# Patient Record
Sex: MALE | Race: WHITE | NOT HISPANIC OR LATINO | Employment: OTHER | URBAN - NONMETROPOLITAN AREA
[De-identification: names, ages, dates, MRNs, and addresses within clinical notes are randomized per-mention and may not be internally consistent; named-entity substitution may affect disease eponyms.]

---

## 2023-02-13 ENCOUNTER — TELEPHONE (OUTPATIENT)
Dept: CARDIOLOGY | Facility: CLINIC | Age: 78
End: 2023-02-13
Payer: MEDICARE

## 2023-02-13 NOTE — TELEPHONE ENCOUNTER
HUB: [facility] For patient to call our office. We can get him in to see Dr. Jon on 2/16/2023 @ 10:30.

## 2023-02-16 ENCOUNTER — OFFICE VISIT (OUTPATIENT)
Dept: CARDIOLOGY | Facility: CLINIC | Age: 78
End: 2023-02-16
Payer: MEDICARE

## 2023-02-16 VITALS
WEIGHT: 140.4 LBS | SYSTOLIC BLOOD PRESSURE: 132 MMHG | OXYGEN SATURATION: 88 % | HEIGHT: 70 IN | BODY MASS INDEX: 20.1 KG/M2 | HEART RATE: 101 BPM | DIASTOLIC BLOOD PRESSURE: 80 MMHG

## 2023-02-16 DIAGNOSIS — I50.9 CONGESTIVE HEART FAILURE, UNSPECIFIED HF CHRONICITY, UNSPECIFIED HEART FAILURE TYPE: ICD-10-CM

## 2023-02-16 DIAGNOSIS — R06.02 SHORTNESS OF BREATH: ICD-10-CM

## 2023-02-16 DIAGNOSIS — I65.23 BILATERAL CAROTID ARTERY STENOSIS: ICD-10-CM

## 2023-02-16 DIAGNOSIS — I10 ESSENTIAL HYPERTENSION: ICD-10-CM

## 2023-02-16 DIAGNOSIS — I48.91 ATRIAL FIBRILLATION, UNSPECIFIED TYPE: Primary | ICD-10-CM

## 2023-02-16 DIAGNOSIS — J90 PLEURAL EFFUSION: ICD-10-CM

## 2023-02-16 DIAGNOSIS — Z87.891 FORMER SMOKER: ICD-10-CM

## 2023-02-16 DIAGNOSIS — I73.9 PAD (PERIPHERAL ARTERY DISEASE): ICD-10-CM

## 2023-02-16 DIAGNOSIS — E78.2 MIXED HYPERLIPIDEMIA: ICD-10-CM

## 2023-02-16 DIAGNOSIS — R60.0 BILATERAL LEG EDEMA: ICD-10-CM

## 2023-02-16 PROBLEM — K21.9 GERD (GASTROESOPHAGEAL REFLUX DISEASE): Status: ACTIVE | Noted: 2023-02-16

## 2023-02-16 PROBLEM — H40.9 GLAUCOMA: Status: ACTIVE | Noted: 2023-02-16

## 2023-02-16 PROBLEM — C61 PROSTATE CANCER: Status: ACTIVE | Noted: 2023-02-16

## 2023-02-16 PROBLEM — J44.9 COPD (CHRONIC OBSTRUCTIVE PULMONARY DISEASE): Status: ACTIVE | Noted: 2023-02-16

## 2023-02-16 PROBLEM — E03.9 HYPOTHYROIDISM: Status: ACTIVE | Noted: 2023-02-16

## 2023-02-16 PROBLEM — J18.9 PNEUMONIA: Status: ACTIVE | Noted: 2023-02-16

## 2023-02-16 PROBLEM — D64.9 ANEMIA: Status: ACTIVE | Noted: 2023-02-16

## 2023-02-16 PROCEDURE — 3079F DIAST BP 80-89 MM HG: CPT | Performed by: INTERNAL MEDICINE

## 2023-02-16 PROCEDURE — 99204 OFFICE O/P NEW MOD 45 MIN: CPT | Performed by: INTERNAL MEDICINE

## 2023-02-16 PROCEDURE — 3075F SYST BP GE 130 - 139MM HG: CPT | Performed by: INTERNAL MEDICINE

## 2023-02-16 RX ORDER — BUDESONIDE 0.5 MG/2ML
INHALANT ORAL 2 TIMES DAILY
COMMUNITY
Start: 2023-02-02

## 2023-02-16 RX ORDER — FUROSEMIDE 40 MG/1
TABLET ORAL
Start: 2023-02-16 | End: 2023-03-01 | Stop reason: SDUPTHER

## 2023-02-16 RX ORDER — CHOLECALCIFEROL (VITAMIN D3) 125 MCG
5 CAPSULE ORAL NIGHTLY
COMMUNITY
End: 2023-02-16

## 2023-02-16 RX ORDER — APIXABAN 2.5 MG/1
TABLET, FILM COATED ORAL 2 TIMES DAILY
COMMUNITY
Start: 2023-02-02 | End: 2023-02-16 | Stop reason: DRUGHIGH

## 2023-02-16 RX ORDER — BIMATOPROST 0.1 MG/ML
1 SOLUTION/ DROPS OPHTHALMIC NIGHTLY
COMMUNITY
Start: 2022-12-07

## 2023-02-16 RX ORDER — FUROSEMIDE 40 MG/1
TABLET ORAL DAILY
COMMUNITY
Start: 2023-02-02 | End: 2023-02-16 | Stop reason: SDUPTHER

## 2023-02-16 RX ORDER — LEVOTHYROXINE SODIUM 0.07 MG/1
TABLET ORAL DAILY
COMMUNITY
Start: 2023-02-08 | End: 2023-04-17 | Stop reason: DRUGHIGH

## 2023-02-16 RX ORDER — ALBUTEROL SULFATE 90 UG/1
2 AEROSOL, METERED RESPIRATORY (INHALATION) EVERY 4 HOURS PRN
COMMUNITY

## 2023-02-16 RX ORDER — METOPROLOL TARTRATE 50 MG/1
50 TABLET, FILM COATED ORAL 2 TIMES DAILY
COMMUNITY
End: 2023-02-16 | Stop reason: DRUGHIGH

## 2023-02-16 RX ORDER — EZETIMIBE 10 MG/1
10 TABLET ORAL DAILY
COMMUNITY

## 2023-02-16 RX ORDER — TAMSULOSIN HYDROCHLORIDE 0.4 MG/1
1 CAPSULE ORAL DAILY
COMMUNITY
End: 2023-02-16

## 2023-02-16 RX ORDER — ALBUTEROL SULFATE 2.5 MG/3ML
2.5 SOLUTION RESPIRATORY (INHALATION) EVERY 4 HOURS PRN
COMMUNITY

## 2023-02-16 RX ORDER — BUDESONIDE, GLYCOPYRROLATE, AND FORMOTEROL FUMARATE 160; 9; 4.8 UG/1; UG/1; UG/1
2 AEROSOL, METERED RESPIRATORY (INHALATION) 2 TIMES DAILY
COMMUNITY
Start: 2023-02-13

## 2023-02-16 RX ORDER — BRINZOLAMIDE/BRIMONIDINE TARTRATE 10; 2 MG/ML; MG/ML
1 SUSPENSION/ DROPS OPHTHALMIC 2 TIMES DAILY
COMMUNITY
Start: 2022-12-07

## 2023-02-16 RX ORDER — PANTOPRAZOLE SODIUM 40 MG/1
40 TABLET, DELAYED RELEASE ORAL DAILY
COMMUNITY

## 2023-02-16 NOTE — PROGRESS NOTES
Subjective   Fransisco Daniel is a 78 y.o. male     Chief Complaint   Patient presents with   • Establish Care     Here for eval.    • Atrial Fibrillation   • Congestive Heart Failure   • Shortness of Breath   • Hyperlipidemia   • Hypertension       PROBLEM LIST:     0. CHF  0.1 Echo, 12- at [facility], EF 60-65%, neg. Bubble study, mild Aortic stenosis, pleural effusion  1. A-Fib, [id number]= 7, on Eliquis  2. HTN  3. Hyperlipidemia  4. Hypothyroidism  5. COPD / Emphysema, followed by Dr. Mitchell.   6. Glaucoma  7. GERD  8. Anemia  9. Hx Prostate CA, followed by Dr. Ames. HX radiation seeds  10. Pleural Effusion  11. HX Aortic aneurysm s/p repair in 2014 at [facility] with long stent graft extending down thoracic aorta into abd and iliac vessels per CT abd./pelvis 12-6-2022 [facility]  12. 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  Former smoker  14. SARAH / PAD per CTA head/neck during [facility] admit 1-2023    Specialty Problems        Cardiology Problems    Atrial fibrillation (HCC)        CHF (congestive heart failure) (HCC)        Essential hypertension        Mixed hyperlipidemia             HPI:    Mr. Fransisco Daniel is an 78-year-old male patient of Maria Elena Murcia seen today to establish care after recent hospitalization at [facility].     He has a long and complex medical history.  He was evaluated at [facility] in the past for leg weakness.    Most recently the patient was admitted to [facility] with hypoxia and sepsis secondary to pneumonia.  He was discharged in early December.  He was shortly thereafter admitted to [facility] with hematochezia related to diverticular bleed.  He was sent to rehab and was discharged in late December.  However, shortly thereafter, he was readmitted with hypoxia and failure to thrive..  He had an extended hospital stay which required thoracentesis, IV  antibiotics, and and supportive care.    The patient has a longstanding history of atrial fibrillation with palpitations.  He also has known chronic lung disease and is followed by pulmonary medicine.  He has a history of prostate cancer and of CHF.  He has had percutaneous intervention for aortic aneurysm and reportedly suffered a TIA while hospitalized.  He describes having had syncope in the past with no specific etiology determined.    Since being hospitalized shortness of breath has improved somewhat but has not returned to baseline.  However, the patient denies miki orthopnea or PND.  He has had progressive lower extremity edema.  Mr. Daniel intermittently senses palpitations and he describes hematochezia on Eliquis.                  PRIOR MEDICATIONS    Current Outpatient Medications on File Prior to Visit   Medication Sig Dispense Refill   • albuterol (PROVENTIL) (2.5 MG/3ML) 0.083% nebulizer solution Take 2.5 mg by nebulization Every 4 (Four) Hours As Needed.     • albuterol sulfate  (90 Base) MCG/ACT inhaler Inhale 2 puffs Every 4 (Four) Hours As Needed.     • Breztri Aerosphere 160-9-4.8 MCG/ACT aerosol inhaler 2 puffs 2 (Two) Times a Day.     • budesonide (PULMICORT) 0.5 MG/2ML nebulizer solution 2 (Two) Times a Day.     • Eliquis 2.5 MG tablet tablet 2 (Two) Times a Day.     • ezetimibe (ZETIA) 10 MG tablet Take 10 mg by mouth Daily.     • furosemide (LASIX) 40 MG tablet Daily.     • levothyroxine (SYNTHROID, LEVOTHROID) 75 MCG tablet Daily.     • Lumigan 0.01 % ophthalmic drops Administer 1 drop to both eyes Every Night.     • metoprolol tartrate (LOPRESSOR) 25 MG tablet 2 (Two) Times a Day.     • pantoprazole (PROTONIX) 40 MG EC tablet Take 40 mg by mouth Daily.     • Simbrinza 1-0.2 % suspension Administer 1 drop to both eyes 2 (Two) Times a Day.     • [DISCONTINUED] melatonin 5 MG tablet tablet Take 5 mg by mouth Every Night.     • [DISCONTINUED] metoprolol tartrate (LOPRESSOR) 50 MG tablet  Take 50 mg by mouth 2 (Two) Times a Day.     • [DISCONTINUED] tamsulosin (FLOMAX) 0.4 MG capsule 24 hr capsule Take 1 capsule by mouth Daily.       No current facility-administered medications on file prior to visit.       ALLERGIES:    Patient has no known allergies.    PAST MEDICAL HISTORY:    Past Medical History:   Diagnosis Date   • Anemia    • Atrial fibrillation (HCC)    • CHF (congestive heart failure) (HCC)    • COPD with emphysema (HCC)    • Essential hypertension    • GERD (gastroesophageal reflux disease)    • Glaucoma    • H/O aortic aneurysm repair    • Hyperlipidemia    • Hypothyroidism    • Pleural effusion    • Prostate cancer (HCC)     seeds plus rad. tx's   • Shortness of breath        SURGICAL HISTORY:    Past Surgical History:   Procedure Laterality Date   • ABDOMINAL AORTIC ANEURYSM REPAIR     • INSERTION PROSTATE RADIATION SEED     • RENAL ARTERY STENT Bilateral     as well as celiac and sup. mesen. arteries   • THORACENTESIS      per [facility] admit Jan.2023       SOCIAL HISTORY:    Social History     Socioeconomic History   • Marital status:    Tobacco Use   • Smoking status: Former     Types: Cigarettes   • Smokeless tobacco: Never   • Tobacco comments:     Smoked for approx. 60 yrs.   Substance and Sexual Activity   • Alcohol use: Never   • Drug use: Never       FAMILY HISTORY:    Family History   Problem Relation Age of Onset   • Liver cancer Mother    • Heart attack Father        Review of Systems   Constitutional: Positive for fatigue. Negative for chills, diaphoresis, fever and unexpected weight change.   HENT: Negative.    Eyes: Positive for visual disturbance (glasses).   Respiratory: Positive for shortness of breath (wears 02, HX COPD/emphysema).    Cardiovascular: Positive for chest pain (lrior to thoracentisis), palpitations (Hx a-fib) and leg swelling.   Gastrointestinal: Positive for blood in stool (in past HX diverticulitis per colonosc. per pt. report). Negative for  "constipation and diarrhea.   Endocrine: Positive for cold intolerance.   Genitourinary: Negative.  Negative for hematuria.   Musculoskeletal: Positive for arthralgias and myalgias.   Skin: Negative.    Allergic/Immunologic: Positive for environmental allergies.   Neurological: Negative.    Hematological: Bruises/bleeds easily (on eliquis).   Psychiatric/Behavioral: Positive for sleep disturbance (off and on).       VISIT VITALS:  Vitals:    02/16/23 1053   BP: 132/80   BP Location: Left arm   Patient Position: Sitting   Pulse: 101   SpO2: (!) 88%   Weight: 63.7 kg (140 lb 6.4 oz)   Height: 177.8 cm (70\")      /80 (BP Location: Left arm, Patient Position: Sitting)   Pulse 101   Ht 177.8 cm (70\")   Wt 63.7 kg (140 lb 6.4 oz)   SpO2 (!) 88% Comment: on 02 at 2 L via N/C  BMI 20.15 kg/m²     RECENT LABS:    Objective       Physical Exam  Vitals and nursing note reviewed.   Constitutional:       General: He is not in acute distress.     Appearance: He is well-developed.   HENT:      Head: Normocephalic and atraumatic.   Eyes:      Conjunctiva/sclera: Conjunctivae normal.      Pupils: Pupils are equal, round, and reactive to light.   Neck:      Vascular: No carotid bruit, hepatojugular reflux or JVD.      Trachea: No tracheal deviation.      Comments: Nl. Carotid upstrokes  Cardiovascular:      Rate and Rhythm: Normal rate. Rhythm irregular.      Pulses:           Radial pulses are 2+ on the right side and 2+ on the left side.      Heart sounds: S1 normal and S2 normal. Heart sounds are distant. No murmur heard.    No friction rub. No S3 sounds.      Comments: Radial rate approx. 80        Pulmonary:      Effort: Pulmonary effort is normal.      Breath sounds: Normal breath sounds. No wheezing, rhonchi or rales.      Comments: Bronchial breath sounds Rt. Mid and lower lungs fields  Dullness  rt. base  Abdominal:      General: Bowel sounds are normal. There is no distension or abdominal bruit.      Palpations: " Abdomen is soft. There is no mass.      Tenderness: There is no abdominal tenderness. There is no guarding or rebound.      Comments: No organomegaly   Musculoskeletal:         General: No tenderness or deformity. Normal range of motion.      Cervical back: Normal range of motion and neck supple.      Right lower leg: Edema present.      Left lower leg: Edema present.      Comments: BLE, 3+ edema to top of calf  Unable to palpate pedal pulses due to edema     Skin:     General: Skin is warm and dry.      Coloration: Skin is not pale.      Findings: No erythema or rash.   Neurological:      Mental Status: He is alert and oriented to person, place, and time.   Psychiatric:         Behavior: Behavior normal.         Thought Content: Thought content normal.         Judgment: Judgment normal.         Procedures      Assessment & Plan   #1.  Atrial fibrillation with rate control.  Based on the patient's current weight we will increase Eliquis to 5 mg twice daily the patient will follow with Maryana Murcia with regard to hematochezia.  This is currently minimal.    2.  Lower extremity edema.  This is multifactorial in etiology.  We will treat symptomatically with compression hose, sodium restriction, and short-term increase in diuretics.  We will check BMP and magnesium in 1 week.    3.  HFPEF.  I think the patient would benefit from SGLT2 inhibitor therapy and we will start Jardiance 10 mg daily in that regard.    4.  Severe chronic lung disease.  We will defer further evaluation and treatment to Dr. Canseco who the patient sees on an outpatient basis.    5.  History of GI bleed related to diverticular disease.  The patient's recent symptoms of hematochezia will need to be followed closely as well as hemoglobin and hematocrit the patient understands to immediately report any significant bleeding.    6.  Vascular disease as per history of present illness above.  We will continue monitoring for present.    7.  Mr. Daniel  will follow with Maryana Murcia as instructed we will plan on seeing him in follow-up in 2 to 3 weeks or on a as needed basis as discussed.   Diagnosis Plan   1. Atrial fibrillation, unspecified type (HCC)        2. Congestive heart failure, unspecified HF chronicity, unspecified heart failure type (HCC)        3. Essential hypertension        4. Mixed hyperlipidemia        5. Pleural effusion        6. Former smoker        7. Shortness of breath        8. Bilateral carotid artery stenosis        9. PAD (peripheral artery disease) (HCC)            No follow-ups on file.         Fransisco Daniel  reports that he has quit smoking. His smoking use included cigarettes. He has never used smokeless tobacco.. I have educated him on the risk of diseases from using tobacco products such as cancer, COPD and heart disease.          Advance Care Planning   ACP discussion was held with the patient during this visit. Patient does not have an advance directive, declines further assistance.      BMI is within normal parameters. No other follow-up for BMI required.             Electronically signed by:    Scribed for Derick Jon MD by Laura Lock LPN on February 16, 2023  at 12:18 EST    I, Derick Jon MD personally performed the services described in this documentation as scribed by the above named individual in my presence, and it is both accurate and complete. February 16, 2023 12:18 EST      Dictated Utilizing Dragon Dictation: Part of this note may be an electronic transcription/translation of spoken language to printed text using the Dragon Dictation System.

## 2023-02-23 ENCOUNTER — LAB (OUTPATIENT)
Dept: LAB | Facility: HOSPITAL | Age: 78
End: 2023-02-23
Payer: MEDICARE

## 2023-02-23 DIAGNOSIS — J90 PLEURAL EFFUSION: ICD-10-CM

## 2023-02-23 DIAGNOSIS — I50.9 CONGESTIVE HEART FAILURE, UNSPECIFIED HF CHRONICITY, UNSPECIFIED HEART FAILURE TYPE: ICD-10-CM

## 2023-02-23 DIAGNOSIS — R06.02 SHORTNESS OF BREATH: ICD-10-CM

## 2023-02-23 DIAGNOSIS — R60.0 BILATERAL LEG EDEMA: ICD-10-CM

## 2023-02-23 LAB
ANION GAP SERPL CALCULATED.3IONS-SCNC: 11.9 MMOL/L (ref 5–15)
BUN SERPL-MCNC: 28 MG/DL (ref 8–23)
BUN/CREAT SERPL: 22.6 (ref 7–25)
CALCIUM SPEC-SCNC: 9.4 MG/DL (ref 8.6–10.5)
CHLORIDE SERPL-SCNC: 101 MMOL/L (ref 98–107)
CO2 SERPL-SCNC: 28.1 MMOL/L (ref 22–29)
CREAT SERPL-MCNC: 1.24 MG/DL (ref 0.76–1.27)
EGFRCR SERPLBLD CKD-EPI 2021: 58.8 ML/MIN/1.73
GLUCOSE SERPL-MCNC: 98 MG/DL (ref 65–99)
MAGNESIUM SERPL-MCNC: 2 MG/DL (ref 1.6–2.4)
POTASSIUM SERPL-SCNC: 4.5 MMOL/L (ref 3.5–5.2)
SODIUM SERPL-SCNC: 141 MMOL/L (ref 136–145)

## 2023-02-23 PROCEDURE — 83735 ASSAY OF MAGNESIUM: CPT | Performed by: INTERNAL MEDICINE

## 2023-02-23 PROCEDURE — 80048 BASIC METABOLIC PNL TOTAL CA: CPT | Performed by: INTERNAL MEDICINE

## 2023-03-01 ENCOUNTER — OFFICE VISIT (OUTPATIENT)
Dept: CARDIOLOGY | Facility: CLINIC | Age: 78
End: 2023-03-01
Payer: MEDICARE

## 2023-03-01 VITALS
BODY MASS INDEX: 19.44 KG/M2 | WEIGHT: 135.8 LBS | HEART RATE: 72 BPM | SYSTOLIC BLOOD PRESSURE: 121 MMHG | OXYGEN SATURATION: 90 % | HEIGHT: 70 IN | DIASTOLIC BLOOD PRESSURE: 78 MMHG

## 2023-03-01 DIAGNOSIS — I65.23 BILATERAL CAROTID ARTERY STENOSIS: ICD-10-CM

## 2023-03-01 DIAGNOSIS — Z87.891 FORMER SMOKER: ICD-10-CM

## 2023-03-01 DIAGNOSIS — I50.9 CONGESTIVE HEART FAILURE, UNSPECIFIED HF CHRONICITY, UNSPECIFIED HEART FAILURE TYPE: ICD-10-CM

## 2023-03-01 DIAGNOSIS — R06.02 SHORTNESS OF BREATH: ICD-10-CM

## 2023-03-01 DIAGNOSIS — I48.91 ATRIAL FIBRILLATION, UNSPECIFIED TYPE: Primary | ICD-10-CM

## 2023-03-01 DIAGNOSIS — I73.9 PAD (PERIPHERAL ARTERY DISEASE): ICD-10-CM

## 2023-03-01 DIAGNOSIS — E78.2 MIXED HYPERLIPIDEMIA: ICD-10-CM

## 2023-03-01 DIAGNOSIS — R60.0 BILATERAL LEG EDEMA: ICD-10-CM

## 2023-03-01 DIAGNOSIS — J90 PLEURAL EFFUSION: ICD-10-CM

## 2023-03-01 DIAGNOSIS — I10 ESSENTIAL HYPERTENSION: ICD-10-CM

## 2023-03-01 PROCEDURE — 99203 OFFICE O/P NEW LOW 30 MIN: CPT | Performed by: INTERNAL MEDICINE

## 2023-03-01 RX ORDER — POTASSIUM CHLORIDE 20 MEQ/1
20 TABLET, EXTENDED RELEASE ORAL DAILY
Qty: 90 TABLET | Refills: 3 | Status: SHIPPED | OUTPATIENT
Start: 2023-03-01

## 2023-03-01 RX ORDER — FUROSEMIDE 40 MG/1
TABLET ORAL
Qty: 9 TABLET | Refills: 0 | Status: SHIPPED | OUTPATIENT
Start: 2023-03-01 | End: 2023-04-04 | Stop reason: SDUPTHER

## 2023-03-01 RX ORDER — FUROSEMIDE 40 MG/1
TABLET ORAL
Qty: 90 TABLET | Refills: 3 | Status: SHIPPED | OUTPATIENT
Start: 2023-03-01 | End: 2023-03-20

## 2023-03-01 RX ORDER — METOLAZONE 2.5 MG/1
TABLET ORAL
Qty: 90 TABLET | Refills: 3 | Status: SHIPPED | OUTPATIENT
Start: 2023-03-01 | End: 2023-04-04

## 2023-03-01 NOTE — PROGRESS NOTES
Samuel Daniel is a 78 y.o. male     Chief Complaint   Patient presents with   • Follow-up     Here for 2 week f/u   • Atrial Fibrillation   • Congestive Heart Failure   • Hyperlipidemia   • Hypertension   • Carotid Artery Disease   • Shortness of Breath       PROBLEM LIST:     0. CHF  0.1 Echo, 12- at [facility], EF 60-65%, neg. Bubble study, mild Aortic stenosis, pleural effusion  1. A-Fib, [id number]= 7, on Eliquis  2. HTN  3. Hyperlipidemia  4. Hypothyroidism  5. COPD / Emphysema, followed by Dr. Mitchell.   6. Glaucoma  7. GERD  8. Anemia  9. Hx Prostate CA, followed by Dr. Ames. HX radiation seeds  10. Pleural Effusion  11. HX Aortic aneurysm s/p repair in 2014 at [facility] with long stent graft extending down thoracic aorta into abd and iliac vessels per CT abd./pelvis 12-6-2022 [facility]  12. 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  Former smoker  14. SARAH / PAD per CTA head/neck during [facility] admit 1-2023    Specialty Problems        Cardiology Problems    Atrial fibrillation (HCC)        Bilateral carotid artery stenosis        CHF (congestive heart failure) ([facility])        Essential hypertension        Mixed hyperlipidemia        PAD (peripheral artery disease) ([facility])             HPI:  Mr. Daniel returns for follow-up on the above.    He noted significant improvement on increased dosing of Lasix.  However, he ran out of medication, has not taken any Lasix for the past 3 days, and is noticed a mild increase in shortness of breath.  Spite no diuretic for 3 days Mr. Daniel has lost 5 pounds and feels that he has had persistent improvement in breathing and lower extremity edema. Labs demonstrated BUN and creatinine of 28 and 1.24.  Potassium was 4.5, magnesium was 2.    Mr. Daniel is tolerated Jardiance without difficulty.                        PRIOR MEDICATIONS    Current Outpatient Medications on File Prior to Visit   Medication Sig  Dispense Refill   • albuterol (PROVENTIL) (2.5 MG/3ML) 0.083% nebulizer solution Take 2.5 mg by nebulization Every 4 (Four) Hours As Needed.     • albuterol sulfate  (90 Base) MCG/ACT inhaler Inhale 2 puffs Every 4 (Four) Hours As Needed.     • apixaban (ELIQUIS) 5 MG tablet tablet Take 1 tablet by mouth 2 (Two) Times a Day. 60 tablet 11   • Breztri Aerosphere 160-9-4.8 MCG/ACT aerosol inhaler 2 puffs 2 (Two) Times a Day.     • budesonide (PULMICORT) 0.5 MG/2ML nebulizer solution 2 (Two) Times a Day.     • empagliflozin (Jardiance) 10 MG tablet tablet Take 1 tablet by mouth Daily. 30 tablet 11   • ezetimibe (ZETIA) 10 MG tablet Take 10 mg by mouth Daily.     • furosemide (LASIX) 40 MG tablet Take 40 mg in am and 40 mg around 1:00 pm x 5 days then back to 40 mg daily     • levothyroxine (SYNTHROID, LEVOTHROID) 75 MCG tablet Daily.     • Lumigan 0.01 % ophthalmic drops Administer 1 drop to both eyes Every Night.     • metoprolol tartrate (LOPRESSOR) 25 MG tablet 2 (Two) Times a Day.     • pantoprazole (PROTONIX) 40 MG EC tablet Take 40 mg by mouth Daily.     • Simbrinza 1-0.2 % suspension Administer 1 drop to both eyes 2 (Two) Times a Day.       No current facility-administered medications on file prior to visit.       ALLERGIES:    Patient has no known allergies.    PAST MEDICAL HISTORY:    Past Medical History:   Diagnosis Date   • Anemia    • Atrial fibrillation (HCC)    • CHF (congestive heart failure) (HCC)    • COPD with emphysema (HCC)    • Essential hypertension    • GERD (gastroesophageal reflux disease)    • Glaucoma    • H/O aortic aneurysm repair    • Hyperlipidemia    • Hypothyroidism    • Pleural effusion    • Prostate cancer (HCC)     seeds plus rad. tx's   • Shortness of breath        SURGICAL HISTORY:    Past Surgical History:   Procedure Laterality Date   • ABDOMINAL AORTIC ANEURYSM REPAIR     • INSERTION PROSTATE RADIATION SEED     • RENAL ARTERY STENT Bilateral     as well as celiac and sup.  "toneen. arteries   • THORACENTESIS      per [facility] admit Jan.2023       SOCIAL HISTORY:    Social History     Socioeconomic History   • Marital status:    Tobacco Use   • Smoking status: Former     Types: Cigarettes   • Smokeless tobacco: Never   • Tobacco comments:     Smoked for approx. 60 yrs.   Substance and Sexual Activity   • Alcohol use: Never   • Drug use: Never       FAMILY HISTORY:    Family History   Problem Relation Age of Onset   • Liver cancer Mother    • Heart attack Father        Review of Systems   Constitutional: Positive for fatigue.   HENT: Negative.    Eyes: Positive for visual disturbance (glasses).   Respiratory: Positive for shortness of breath (wears 02).    Cardiovascular: Positive for palpitations and leg swelling (wearing compression stockings). Negative for chest pain.   Gastrointestinal: Negative.    Endocrine: Negative.    Genitourinary: Negative.    Musculoskeletal: Positive for arthralgias and myalgias.   Skin: Negative.    Allergic/Immunologic: Positive for environmental allergies.   Neurological: Positive for dizziness. Negative for syncope.   Hematological: Bruises/bleeds easily.   Psychiatric/Behavioral: Negative.        VISIT VITALS:  Vitals:    03/01/23 1201   Height: 177.8 cm (70\")      Ht 177.8 cm (70\")   BMI 20.15 kg/m²     RECENT LABS:    Objective       Physical Exam  Vitals and nursing note reviewed.   Constitutional:       General: He is not in acute distress.     Appearance: He is well-developed.   HENT:      Head: Normocephalic and atraumatic.   Eyes:      Conjunctiva/sclera: Conjunctivae normal.      Pupils: Pupils are equal, round, and reactive to light.   Neck:      Vascular: No hepatojugular reflux or JVD.      Trachea: No tracheal deviation.   Cardiovascular:      Rate and Rhythm: Normal rate and regular rhythm.      Pulses:           Radial pulses are 2+ on the right side and 2+ on the left side.      Heart sounds: Normal heart sounds.      Comments: 1/6 " apical systolic murmur  Pulmonary:      Effort: Pulmonary effort is normal.      Breath sounds: Decreased breath sounds (mod. ) present. No wheezing, rhonchi or rales.      Comments: Mildly increased Expir. Phase      Abdominal:      General: Bowel sounds are normal. There is no distension.      Palpations: Abdomen is soft. There is no mass.      Tenderness: There is no abdominal tenderness. There is no guarding or rebound.   Musculoskeletal:         General: No tenderness or deformity. Normal range of motion.      Cervical back: Normal range of motion and neck supple.      Right lower leg: Edema present.      Left lower leg: Edema present.      Comments: LLE, 1-2+ edema to upper calf, palpable DP pedal pulse  RLE, 1-2+ edema to upper calf , unable to palpatate pedal pulses   Skin:     General: Skin is warm and dry.      Coloration: Skin is not pale.      Findings: No erythema or rash.   Neurological:      Mental Status: He is alert and oriented to person, place, and time.   Psychiatric:         Behavior: Behavior normal.         Thought Content: Thought content normal.         Judgment: Judgment normal.         Procedures      Assessment & Plan   #1.    HFpEF.  The patient is improved on diuretic therapy and has tolerated SGLT2 inhibitor therapy.  As Mr. Daniel is missed several days of Lasix we will restart Lasix at 40 mg twice daily staggered dosing for 3 days.  He will then go back to 40 mg of Lasix in the morning along with 2.5 mg of metolazone.  The patient will follow weights and we will recheck labs in 2 weeks.    2.  Other problems as outlined above are currently stable.    3.  If increased diuretic as tolerated clinically, and electrolytes and renal function are acceptable, we will try to extend follow-up visit to 4 to 6 months.  Her, Mr. Murcia understands he can contact us for any adverse events.  He will follow with Maryana Murcia as instructed.   Diagnosis Plan   1. Atrial fibrillation, unspecified type  (HCC)        2. Bilateral carotid artery stenosis        3. Congestive heart failure, unspecified HF chronicity, unspecified heart failure type (HCC)        4. Essential hypertension        5. Mixed hyperlipidemia        6. PAD (peripheral artery disease) (HCC)        7. Shortness of breath        8. Former smoker            No follow-ups on file.         Fransisco Daniel  reports that he has quit smoking. His smoking use included cigarettes. He has never used smokeless tobacco.. I have educated him on the risk of diseases from using tobacco products such as cancer, COPD and heart disease.       Advance Care Planning   ACP discussion was held with the patient during this visit. Patient does not have an advance directive, declines further assistance.           BMI is within normal parameters. No other follow-up for BMI required.             Electronically signed by:    Scribed for Derick Jon MD by Laura Lock LPN on March 1, 2023  at 12:06 EST    I, Derick Jon MD personally performed the services described in this documentation as scribed by the above named individual in my presence, and it is both accurate and complete. March 1, 2023 12:06 EST      Dictated Utilizing Dragon Dictation: Part of this note may be an electronic transcription/translation of spoken language to printed text using the Dragon Dictation System.

## 2023-03-20 ENCOUNTER — OFFICE VISIT (OUTPATIENT)
Dept: CARDIOLOGY | Facility: CLINIC | Age: 78
End: 2023-03-20
Payer: MEDICARE

## 2023-03-20 ENCOUNTER — TELEPHONE (OUTPATIENT)
Dept: CARDIOLOGY | Facility: CLINIC | Age: 78
End: 2023-03-20
Payer: MEDICARE

## 2023-03-20 ENCOUNTER — LAB (OUTPATIENT)
Dept: LAB | Facility: HOSPITAL | Age: 78
End: 2023-03-20
Payer: MEDICARE

## 2023-03-20 VITALS
BODY MASS INDEX: 17.52 KG/M2 | WEIGHT: 122.4 LBS | HEART RATE: 90 BPM | DIASTOLIC BLOOD PRESSURE: 70 MMHG | HEIGHT: 70 IN | SYSTOLIC BLOOD PRESSURE: 134 MMHG | OXYGEN SATURATION: 92 %

## 2023-03-20 DIAGNOSIS — I10 ESSENTIAL HYPERTENSION: ICD-10-CM

## 2023-03-20 DIAGNOSIS — E78.2 MIXED HYPERLIPIDEMIA: ICD-10-CM

## 2023-03-20 DIAGNOSIS — I48.91 ATRIAL FIBRILLATION, UNSPECIFIED TYPE: Primary | ICD-10-CM

## 2023-03-20 DIAGNOSIS — I48.91 ATRIAL FIBRILLATION, UNSPECIFIED TYPE: ICD-10-CM

## 2023-03-20 DIAGNOSIS — Z87.891 FORMER SMOKER: ICD-10-CM

## 2023-03-20 DIAGNOSIS — R06.02 SHORTNESS OF BREATH: ICD-10-CM

## 2023-03-20 DIAGNOSIS — I65.23 BILATERAL CAROTID ARTERY STENOSIS: ICD-10-CM

## 2023-03-20 DIAGNOSIS — I50.9 CONGESTIVE HEART FAILURE, UNSPECIFIED HF CHRONICITY, UNSPECIFIED HEART FAILURE TYPE: ICD-10-CM

## 2023-03-20 DIAGNOSIS — I73.9 PAD (PERIPHERAL ARTERY DISEASE): ICD-10-CM

## 2023-03-20 LAB
ALBUMIN SERPL-MCNC: 4.2 G/DL (ref 3.5–5.2)
ALBUMIN/GLOB SERPL: 1.4 G/DL
ALP SERPL-CCNC: 97 U/L (ref 39–117)
ALT SERPL W P-5'-P-CCNC: 13 U/L (ref 1–41)
ANION GAP SERPL CALCULATED.3IONS-SCNC: 15.8 MMOL/L (ref 5–15)
AST SERPL-CCNC: 21 U/L (ref 1–40)
BILIRUB SERPL-MCNC: 0.5 MG/DL (ref 0–1.2)
BUN SERPL-MCNC: 59 MG/DL (ref 8–23)
BUN/CREAT SERPL: 34.9 (ref 7–25)
CALCIUM SPEC-SCNC: 9.9 MG/DL (ref 8.6–10.5)
CHLORIDE SERPL-SCNC: 86 MMOL/L (ref 98–107)
CO2 SERPL-SCNC: 32.2 MMOL/L (ref 22–29)
CREAT SERPL-MCNC: 1.69 MG/DL (ref 0.76–1.27)
EGFRCR SERPLBLD CKD-EPI 2021: 40.5 ML/MIN/1.73
GLOBULIN UR ELPH-MCNC: 2.9 GM/DL
GLUCOSE SERPL-MCNC: 148 MG/DL (ref 65–99)
MAGNESIUM SERPL-MCNC: 2 MG/DL (ref 1.6–2.4)
POTASSIUM SERPL-SCNC: 3.4 MMOL/L (ref 3.5–5.2)
PROT SERPL-MCNC: 7.1 G/DL (ref 6–8.5)
SODIUM SERPL-SCNC: 134 MMOL/L (ref 136–145)

## 2023-03-20 PROCEDURE — 3078F DIAST BP <80 MM HG: CPT | Performed by: SPECIALIST

## 2023-03-20 PROCEDURE — 83735 ASSAY OF MAGNESIUM: CPT | Performed by: SPECIALIST

## 2023-03-20 PROCEDURE — 99214 OFFICE O/P EST MOD 30 MIN: CPT | Performed by: SPECIALIST

## 2023-03-20 PROCEDURE — 3075F SYST BP GE 130 - 139MM HG: CPT | Performed by: SPECIALIST

## 2023-03-20 PROCEDURE — 80053 COMPREHEN METABOLIC PANEL: CPT | Performed by: SPECIALIST

## 2023-03-20 NOTE — TELEPHONE ENCOUNTER
----- Message from Laura Lock LPN sent at 3/20/2023  4:25 PM EDT -----  Per note needs 2.5 mg po bid due to adjust for age and weight. Thanks  ----- Message -----  From: Rhianna Escobar MA  Sent: 3/20/2023   3:19 PM EDT  To: RAKESH Lares request confirmation call or new script on Eliquis. They received 2.5 mg and unsure when 5 mg is needed per note. If patient will be taking 5 mg they need a RX for 5 mg.      Note to Pharmacy: Increase to 5 mg po bid

## 2023-03-20 NOTE — PROGRESS NOTES
Subjective   Follow up, chronic HFpEF  Fransisco Daniel is a 78 y.o. male who presents to day for Hypotension, Dizziness, and Nausea.    CHIEF COMPLIANT  Chief Complaint   Patient presents with   • Hypotension   • Dizziness   • Nausea        PROBLEM LIST:      0. CHF  0.1 Echo, 12- at [facility], EF 60-65%, neg. Bubble study, mild Aortic stenosis, pleural effusion  1. A-Fib, [id number]= 7, on Eliquis  2. HTN  3. Hyperlipidemia  4. Hypothyroidism  5. COPD / Emphysema, followed by Dr. Mitchell.   6. Glaucoma  7. GERD  8. Anemia  9. Hx Prostate CA, followed by Dr. Ames. HX radiation seeds  10. Pleural Effusion  11. HX Aortic aneurysm s/p repair in 2014 at [facility] with long stent graft extending down thoracic aorta into abd and iliac vessels per CT abd./pelvis 12-6-2022 [facility]  12. 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  Former smoker  14. SARAH / PAD per CTA head/neck during [facility] admit 1-2023    Problem List Items Addressed This Visit        Cardiac and Vasculature    Essential hypertension    Relevant Orders    Comprehensive Metabolic Panel    Magnesium    Mixed hyperlipidemia    Atrial fibrillation (HCC) - Primary    Relevant Medications    apixaban (ELIQUIS) 2.5 MG tablet tablet    CHF (congestive heart failure) ([facility])    Bilateral carotid artery stenosis    PAD (peripheral artery disease) ([facility])       Pulmonary and Pneumonias    Shortness of breath       Tobacco    Former smoker       HPI    Patient is feeling a lot better with shortness of breath improved and the edema has resolved with he occasionally has palpitations with no chest pain he noted that his blood pressure now has been down with a systolic blood pressures about 85-90 and he gets a little bit lightheaded when he stands up from sitting position                      PRIOR MEDS  Current Outpatient Medications on File Prior to Visit   Medication Sig Dispense Refill   • albuterol  (PROVENTIL) (2.5 MG/3ML) 0.083% nebulizer solution Take 2.5 mg by nebulization Every 4 (Four) Hours As Needed.     • albuterol sulfate  (90 Base) MCG/ACT inhaler Inhale 2 puffs Every 4 (Four) Hours As Needed.     • Breztri Aerosphere 160-9-4.8 MCG/ACT aerosol inhaler 2 puffs 2 (Two) Times a Day.     • budesonide (PULMICORT) 0.5 MG/2ML nebulizer solution 2 (Two) Times a Day.     • empagliflozin (Jardiance) 10 MG tablet tablet Take 1 tablet by mouth Daily. 30 tablet 11   • ezetimibe (ZETIA) 10 MG tablet Take 1 tablet by mouth Daily.     • furosemide (LASIX) 40 MG tablet Take 40 mg in am and 40 mg around 1:00 pm x 3 days then back to 40 mg daily (Patient taking differently: Take 1 tablet by mouth Daily.) 9 tablet 0   • levothyroxine (SYNTHROID, LEVOTHROID) 75 MCG tablet Daily.     • Lumigan 0.01 % ophthalmic drops Administer 1 drop to both eyes Every Night.     • metOLazone (ZAROXOLYN) 2.5 MG tablet Take 2.5 mg po daily prior to am Lasix dose after completes x3 days of increased lasix dosing (Patient taking differently: Take 1 tablet by mouth Daily.) 90 tablet 3   • metoprolol tartrate (LOPRESSOR) 25 MG tablet 2 (Two) Times a Day. Has not taken last 2-3 days because of hypotn.     • pantoprazole (PROTONIX) 40 MG EC tablet Take 1 tablet by mouth Daily.     • potassium chloride (K-DUR,KLOR-CON) 20 MEQ CR tablet Take 1 tablet by mouth Daily. 90 tablet 3   • Simbrinza 1-0.2 % suspension Administer 1 drop to both eyes 2 (Two) Times a Day.     • [DISCONTINUED] apixaban (ELIQUIS) 5 MG tablet tablet Take 1 tablet by mouth 2 (Two) Times a Day. 60 tablet 11   • [DISCONTINUED] furosemide (LASIX) 40 MG tablet Take 40 mg in am and 40 mg around 1:00 pm x 3 days then back to 40 mg daily 90 tablet 3     No current facility-administered medications on file prior to visit.       ALLERGIES  Patient has no known allergies.    HISTORY  Past Medical History:   Diagnosis Date   • Anemia    • Atrial fibrillation (HCC)    • CHF  "(congestive heart failure) (HCC)    • COPD with emphysema (HCC)    • Essential hypertension    • GERD (gastroesophageal reflux disease)    • Glaucoma    • H/O aortic aneurysm repair    • Hyperlipidemia    • Hypothyroidism    • Pleural effusion    • Prostate cancer (HCC)     seeds plus rad. tx's   • Shortness of breath        Social History     Socioeconomic History   • Marital status:    Tobacco Use   • Smoking status: Former     Types: Cigarettes   • Smokeless tobacco: Never   • Tobacco comments:     Smoked for approx. 60 yrs.   Substance and Sexual Activity   • Alcohol use: Never   • Drug use: Never       Family History   Problem Relation Age of Onset   • Liver cancer Mother    • Heart attack Father        Review of Systems   Constitutional: Positive for fatigue.   HENT: Negative.    Eyes: Positive for visual disturbance (glasses).   Respiratory: Positive for shortness of breath (wears 02).    Cardiovascular: Positive for palpitations (HX a-fib). Negative for chest pain and leg swelling.   Gastrointestinal: Negative.    Endocrine: Negative.    Genitourinary: Negative.    Musculoskeletal: Positive for arthralgias and myalgias.   Skin: Negative.    Allergic/Immunologic: Negative.    Neurological: Positive for dizziness and light-headedness. Negative for syncope.   Hematological: Bruises/bleeds easily.   Psychiatric/Behavioral: Negative.        Objective     VITALS: /70 (BP Location: Left arm, Patient Position: Sitting)   Pulse 90   Ht 177.8 cm (70\")   Wt 55.5 kg (122 lb 6.4 oz)   SpO2 92% Comment: on 02 at 2-3 L via N/C  BMI 17.56 kg/m²     LABS:   Lab Results (most recent)     None          IMAGING:   No Images in the past 120 days found..    EXAM:  Physical Exam  Constitutional:       Appearance: He is well-developed.   HENT:      Head: Normocephalic and atraumatic.   Eyes:      Pupils: Pupils are equal, round, and reactive to light.   Neck:      Thyroid: No thyromegaly.      Vascular: No JVD. "   Cardiovascular:      Rate and Rhythm: Normal rate and regular rhythm.      Chest Wall: PMI is not displaced.      Pulses: Normal pulses.      Heart sounds: Normal heart sounds, S1 normal and S2 normal. No murmur heard.    No friction rub. No gallop.   Pulmonary:      Effort: Pulmonary effort is normal. No respiratory distress.      Breath sounds: Normal breath sounds. No stridor. No wheezing or rales.   Chest:      Chest wall: No tenderness.   Abdominal:      General: Bowel sounds are normal. There is no distension.      Palpations: Abdomen is soft. There is no mass.      Tenderness: There is no abdominal tenderness. There is no guarding or rebound.   Musculoskeletal:      Cervical back: Neck supple. No edema.   Skin:     General: Skin is warm and dry.      Coloration: Skin is not pale.      Findings: No erythema or rash.   Neurological:      Mental Status: He is alert and oriented to person, place, and time.      Cranial Nerves: No cranial nerve deficit.      Coordination: Coordination normal.   Psychiatric:         Behavior: Behavior normal.         Procedure   Procedures       Assessment & Plan     Diagnoses and all orders for this visit:    1. Atrial fibrillation, unspecified type ([facility]) (Primary)  -     Discontinue: apixaban (ELIQUIS) 2.5 MG tablet tablet; Take 1 tablet by mouth 2 (Two) Times a Day.  Dispense: 180 tablet; Refill: 1  -     apixaban (ELIQUIS) 2.5 MG tablet tablet; Take 1 tablet by mouth 2 (Two) Times a Day.  Dispense: 180 tablet; Refill: 1    2. Bilateral carotid artery stenosis    3. Congestive heart failure, unspecified HF chronicity, unspecified heart failure type ([facility])    4. Essential hypertension  -     Comprehensive Metabolic Panel; Future  -     Magnesium; Future    5. Mixed hyperlipidemia    6. PAD (peripheral artery disease) ([facility])    7. Shortness of breath    8. Former smoker    1.  Patient now is compensated with no edema and his lungs are clear he starts getting hypotensive he lost 12  pounds since the first of this month so I am going to check his creatinine and potassium I had a long discussion with him about diuretic usage and cutting down the salt and daily weight we will going to hold off the metolazone except for as needed at the same time he will weigh yourself and he will use also the furosemide as a as needed possibly of every day depends on his needs, he is otherwise potassium we will continue with that with the water pills but we may consider changing this in the future and to spironolactone  2.  His weight now is 56 per [place] kilograms and he is 78 years old so I am going to cut down the dose of his Eliquis to 2.5 mg twice daily  3.  Currently he is in sinus rhythm we will continue current management  4.  His blood pressure here is normal but at home is low I suspect he is getting into the dry side now so we will check his creatinine as mentioned above    Return in about 4 weeks (around 4/17/2023).      Advance Care Planning   ACP discussion was held with the patient during this visit. Patient does not have an advance directive, declines further assistance.             MEDS ORDERED DURING VISIT:  New Medications Ordered This Visit   Medications   • apixaban (ELIQUIS) 2.5 MG tablet tablet     Sig: Take 1 tablet by mouth 2 (Two) Times a Day.     Dispense:  180 tablet     Refill:  1     Increase to 5 mg po bid       As always, Maria Elena Murcia APRN  I appreciate very much the opportunity to participate in the cardiovascular care of your patients. Please do not hesitate to call me with any questions with regards to Fransisco Marieabbey evaluation and management.         This document has been electronically signed by Bonnie Coates MD  March 20, 2023 13:51 EDT    This note is dictated utilizing voice recognition software.

## 2023-03-28 ENCOUNTER — TELEPHONE (OUTPATIENT)
Dept: CARDIOLOGY | Facility: CLINIC | Age: 78
End: 2023-03-28
Payer: MEDICARE

## 2023-03-28 NOTE — TELEPHONE ENCOUNTER
----- Message from Kimberly Neumann MA sent at 3/28/2023  4:37 PM EDT -----  Bonnie Coates MD Thomas, Kimberly Shah MA  Please call the patient regarding his abnormal result.   Please send a copy to the primary care physician

## 2023-03-28 NOTE — TELEPHONE ENCOUNTER
Glucose  65 - 99 mg/dL 148 High   98    BUN  8 - 23 mg/dL 59 High   28 High     Creatinine  0.76 - 1.27 mg/dL 1.69 High   1.24    Sodium  136 - 145 mmol/L 134 Low   141    Potassium  3.5 - 5.2 mmol/L 3.4 Low   4.5    Chloride  98 - 107 mmol/L 86 Low   101    CO2  22.0 - 29.0 mmol/L 32.2 High   28.1      BUN/Creatinine Ratio  7.0 - 25.0 34.9 High   22.6    Anion Gap  5.0 - 15.0 mmol/L 15.8 High   11.9    eGFR  >60.0 mL/min/1.73 40.5 Low   58.8 Low           Results forwarded to PCP.

## 2023-03-28 NOTE — TELEPHONE ENCOUNTER
I called and informed pt's wife of results and that we sent to PCP to address, she verbalized understanding.

## 2023-04-04 ENCOUNTER — TELEPHONE (OUTPATIENT)
Dept: CARDIOLOGY | Facility: CLINIC | Age: 78
End: 2023-04-04
Payer: MEDICARE

## 2023-04-04 DIAGNOSIS — R06.02 SHORTNESS OF BREATH: ICD-10-CM

## 2023-04-04 DIAGNOSIS — J90 PLEURAL EFFUSION: ICD-10-CM

## 2023-04-04 DIAGNOSIS — I50.9 CONGESTIVE HEART FAILURE, UNSPECIFIED HF CHRONICITY, UNSPECIFIED HEART FAILURE TYPE: ICD-10-CM

## 2023-04-04 DIAGNOSIS — R60.0 BILATERAL LEG EDEMA: ICD-10-CM

## 2023-04-04 RX ORDER — FUROSEMIDE 40 MG/1
TABLET ORAL
Qty: 9 TABLET | Refills: 0
Start: 2023-04-04 | End: 2023-04-17 | Stop reason: SDUPTHER

## 2023-04-04 NOTE — TELEPHONE ENCOUNTER
Wife here to as a NP today, patient at her side and was concerned about  His labs, felling bad, etc. Wants to be seen by Dr. Jon for future appts. Chart reviewed by Dr. Jon and eventually patient states he was taking metolazone prior to am lasix daily and Lasix am daily. V/o's per Dr. Jon to only take the Lasix daily prn for increased BLE edema and / or > 2 lb. Weight gain and stop metolazone. Patient and wife both verbalized they understood. PH,LPN

## 2023-04-17 ENCOUNTER — OFFICE VISIT (OUTPATIENT)
Dept: CARDIOLOGY | Facility: CLINIC | Age: 78
End: 2023-04-17
Payer: MEDICARE

## 2023-04-17 VITALS
SYSTOLIC BLOOD PRESSURE: 108 MMHG | DIASTOLIC BLOOD PRESSURE: 63 MMHG | OXYGEN SATURATION: 97 % | HEART RATE: 99 BPM | WEIGHT: 131.6 LBS | BODY MASS INDEX: 18.84 KG/M2 | HEIGHT: 70 IN

## 2023-04-17 DIAGNOSIS — J90 PLEURAL EFFUSION: ICD-10-CM

## 2023-04-17 DIAGNOSIS — I50.9 CONGESTIVE HEART FAILURE, UNSPECIFIED HF CHRONICITY, UNSPECIFIED HEART FAILURE TYPE: ICD-10-CM

## 2023-04-17 DIAGNOSIS — I48.91 ATRIAL FIBRILLATION, UNSPECIFIED TYPE: Primary | ICD-10-CM

## 2023-04-17 DIAGNOSIS — I10 ESSENTIAL HYPERTENSION: ICD-10-CM

## 2023-04-17 DIAGNOSIS — I65.23 BILATERAL CAROTID ARTERY STENOSIS: ICD-10-CM

## 2023-04-17 DIAGNOSIS — R60.0 BILATERAL LEG EDEMA: ICD-10-CM

## 2023-04-17 DIAGNOSIS — I73.9 PAD (PERIPHERAL ARTERY DISEASE): ICD-10-CM

## 2023-04-17 DIAGNOSIS — Z87.891 FORMER SMOKER: ICD-10-CM

## 2023-04-17 DIAGNOSIS — E78.2 MIXED HYPERLIPIDEMIA: ICD-10-CM

## 2023-04-17 DIAGNOSIS — R06.02 SHORTNESS OF BREATH: ICD-10-CM

## 2023-04-17 RX ORDER — FUROSEMIDE 40 MG/1
TABLET ORAL
Qty: 9 TABLET | Refills: 0
Start: 2023-04-17

## 2023-04-17 RX ORDER — LEVOTHYROXINE SODIUM 0.03 MG/1
TABLET ORAL DAILY
COMMUNITY
Start: 2023-04-11

## 2023-04-17 NOTE — PROGRESS NOTES
Subjective   Fransisco Daniel is a 78 y.o. male     Chief Complaint   Patient presents with   • Follow-up     Here for 2 week f/u   • Atrial Fibrillation   • Hyperlipidemia   • Hypertension   • Congestive Heart Failure   • Carotid Artery Disease   • Shortness of Breath       PROBLEM LIST:     . CHF  0.1 Echo, 12- at [facility], EF 60-65%, neg. Bubble study, mild Aortic stenosis, pleural effusion  1. A-Fib, [id number]= 7, on Eliquis  2. HTN  3. Hyperlipidemia  4. Hypothyroidism  5. COPD / Emphysema, followed by Dr. Mitchell.   6. Glaucoma  7. GERD  8. Anemia  9. Hx Prostate CA, followed by Dr. Ames. HX radiation seeds  10. Pleural Effusion  11. HX Aortic aneurysm s/p repair in 2014 at [facility] with long stent graft extending down thoracic aorta into abd and iliac vessels per CT abd./pelvis 12-6-2022 [facility]  12. 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  Former smoker  14. SARAH / PAD per CTA head/neck during [facility] admit 1-2023    Specialty Problems        Cardiology Problems    Atrial fibrillation        Bilateral carotid artery stenosis        CHF (congestive heart failure)        Essential hypertension        Mixed hyperlipidemia        PAD (peripheral artery disease)             HPI:    Mr. Daniel returns for follow-up.    After several days off diuretic he noted significant lower extremity edema.  He started taking Lasix daily.  He never performed daily weights with additional Lasix for weight gain as previously instructed.    Currently, Mr. Daniel states that he is still lightheaded without significant improvement but his breathing is stable to perhaps mildly improved.  He has had only mild worsening of lower extremity edema despite regaining 9 of the 13 pounds he lost when diuretics were increased early last month.  It is unclear to me if Mr. Daniel is restricting sodium.  He does not weigh on a daily basis as previously  instructed.                    PRIOR MEDICATIONS    Current Outpatient Medications on File Prior to Visit   Medication Sig Dispense Refill   • albuterol (PROVENTIL) (2.5 MG/3ML) 0.083% nebulizer solution Take 2.5 mg by nebulization Every 4 (Four) Hours As Needed.     • albuterol sulfate  (90 Base) MCG/ACT inhaler Inhale 2 puffs Every 4 (Four) Hours As Needed.     • apixaban (ELIQUIS) 2.5 MG tablet tablet Take 1 tablet by mouth 2 (Two) Times a Day. 180 tablet 1   • Breztri Aerosphere 160-9-4.8 MCG/ACT aerosol inhaler 2 puffs 2 (Two) Times a Day.     • budesonide (PULMICORT) 0.5 MG/2ML nebulizer solution 2 (Two) Times a Day.     • empagliflozin (Jardiance) 10 MG tablet tablet Take 1 tablet by mouth Daily. 30 tablet 11   • ezetimibe (ZETIA) 10 MG tablet Take 1 tablet by mouth Daily.     • furosemide (LASIX) 40 MG tablet ONLY TAKE DAILY PRN FOR INCREASED BLE EDEMA AND / OR > 2 LB. WEIGHT GAIN (Patient taking differently: ONLY TAKE DAILY PRN FOR INCREASED BLE EDEMA AND / OR > 2 LB. WEIGHT GAI    Stopped x 3-4 days edema worsened, so been taking daily for a week now) 9 tablet 0   • levothyroxine (SYNTHROID, LEVOTHROID) 25 MCG tablet Daily.     • Lumigan 0.01 % ophthalmic drops Administer 1 drop to both eyes Every Night.     • metoprolol tartrate (LOPRESSOR) 25 MG tablet 2 (Two) Times a Day. Has not taken occas. because of hypotn.     • pantoprazole (PROTONIX) 40 MG EC tablet Take 1 tablet by mouth Daily.     • potassium chloride (K-DUR,KLOR-CON) 20 MEQ CR tablet Take 1 tablet by mouth Daily. 90 tablet 3   • Simbrinza 1-0.2 % suspension Administer 1 drop to both eyes 2 (Two) Times a Day.     • [DISCONTINUED] levothyroxine (SYNTHROID, LEVOTHROID) 75 MCG tablet Daily.       No current facility-administered medications on file prior to visit.       ALLERGIES:    Patient has no known allergies.    PAST MEDICAL HISTORY:    Past Medical History:   Diagnosis Date   • Anemia    • Atrial fibrillation    • CHF (congestive  "heart failure)    • COPD with emphysema    • Essential hypertension    • GERD (gastroesophageal reflux disease)    • Glaucoma    • H/O aortic aneurysm repair    • Hyperlipidemia    • Hypothyroidism    • Pleural effusion    • Prostate cancer     seeds plus rad. tx's   • Shortness of breath        SURGICAL HISTORY:    Past Surgical History:   Procedure Laterality Date   • ABDOMINAL AORTIC ANEURYSM REPAIR     • INSERTION PROSTATE RADIATION SEED     • RENAL ARTERY STENT Bilateral     as well as celiac and sup. mesen. arteries   • THORACENTESIS      per [facility] admit Jan.2023       SOCIAL HISTORY:    Social History     Socioeconomic History   • Marital status:    Tobacco Use   • Smoking status: Former     Types: Cigarettes   • Smokeless tobacco: Never   • Tobacco comments:     Smoked for approx. 60 yrs.   Substance and Sexual Activity   • Alcohol use: Never   • Drug use: Never       FAMILY HISTORY:    Family History   Problem Relation Age of Onset   • Liver cancer Mother    • Heart attack Father        Review of Systems   Constitutional: Positive for fatigue.   HENT: Negative.    Eyes: Positive for visual disturbance (glasses).   Respiratory: Positive for shortness of breath (wears 02).    Cardiovascular: Positive for palpitations and leg swelling. Negative for chest pain.   Gastrointestinal: Negative.    Endocrine: Negative.    Genitourinary: Negative.    Skin: Negative.    Allergic/Immunologic: Positive for environmental allergies.   Neurological: Negative for dizziness, syncope and light-headedness.   Hematological: Bruises/bleeds easily.   Psychiatric/Behavioral: Negative.        VISIT VITALS:  Vitals:    04/17/23 0859   BP: 108/63   BP Location: Left arm   Patient Position: Sitting   Pulse: 99   SpO2: 97%   Weight: 59.7 kg (131 lb 9.6 oz)   Height: 177.8 cm (70\")      /63 (BP Location: Left arm, Patient Position: Sitting)   Pulse 99   Ht 177.8 cm (70\")   Wt 59.7 kg (131 lb 9.6 oz)   SpO2 97% Comment: " 02 at 4L portable, 2L at home  BMI 18.88 kg/m²     RECENT LABS:    Objective       Physical Exam  Abbreviated physical exam today demonstrates no JVD but positive HJR.  The patient describes left posterior wall chest pain but has no pleural rub.  He has decreased breath sounds diffusely bilaterally with bronchial breath sounds in the bases but no rales.  Cardiac exam reveals an irregular rhythm with no S3.  There is a 1/6 systolic ejection murmur 1/6 MRJerad  Limits benign.  There is 2+ edema to the lower calf on the left trace edema on the right  Procedures      Assessment & Plan   #1.  Heart failure related to HFPEF.  I think the patient is only minimally volume overloaded today.  He is still 4 pounds below his weight when diuretics were increased in early March.  Therefore, I have asked the patient to continue daily Lasix.  I reemphasized that he is to weigh daily in the morning and take a second Lasix at 1:59 in the afternoon at a time his weight is 2 pounds above today's weight.  We will need to call the patient in a week or 2 to ensure he understands these instructions.  He is also given recent instructions today.    2.  The patient will continue sodium restriction, compression hose, and activity as able.    3.  Mr. Daniel will follow up with Maryana Murcia as instructed and we will plan on seeing him in follow-up in 2 months or on a as needed basis.  The patient should have repeat labs done before his next visit.   Diagnosis Plan   1. Atrial fibrillation, unspecified type        2. Bilateral carotid artery stenosis        3. Congestive heart failure, unspecified HF chronicity, unspecified heart failure type        4. Essential hypertension        5. Mixed hyperlipidemia        6. PAD (peripheral artery disease)        7. Shortness of breath        8. Former smoker            No follow-ups on file.         Fransisco Daniel  reports that he has quit smoking. His smoking use included cigarettes. He has never used  smokeless tobacco.. I have educated him on the risk of diseases from using tobacco products such as cancer, COPD and heart disease.        Advance Care Planning   ACP discussion was held with the patient during this visit. Patient does not have an advance directive, declines further assistance.        BMI is within normal parameters. No other follow-up for BMI required.           Electronically signed by:    Scribed for Derick Jon MD by Larua Lock LPN on April 17, 2023  at 09:05 EDT    I, Derick Jon MD personally performed the services described in this documentation as scribed by the above named individual in my presence, and it is both accurate and complete. April 17, 2023 09:05 EDT      Dictated Utilizing Dragon Dictation: Part of this note may be an electronic transcription/translation of spoken language to printed text using the Dragon Dictation System.

## 2023-09-14 DIAGNOSIS — I48.91 ATRIAL FIBRILLATION, UNSPECIFIED TYPE: ICD-10-CM

## 2023-09-14 RX ORDER — APIXABAN 2.5 MG/1
TABLET, FILM COATED ORAL
Qty: 180 TABLET | Refills: 1 | Status: SHIPPED | OUTPATIENT
Start: 2023-09-14

## 2023-10-02 ENCOUNTER — OFFICE VISIT (OUTPATIENT)
Dept: CARDIOLOGY | Facility: CLINIC | Age: 78
End: 2023-10-02
Payer: MEDICARE

## 2023-10-02 VITALS
DIASTOLIC BLOOD PRESSURE: 69 MMHG | OXYGEN SATURATION: 96 % | WEIGHT: 122.4 LBS | BODY MASS INDEX: 17.52 KG/M2 | SYSTOLIC BLOOD PRESSURE: 106 MMHG | HEIGHT: 70 IN | HEART RATE: 77 BPM

## 2023-10-02 DIAGNOSIS — E78.2 MIXED HYPERLIPIDEMIA: ICD-10-CM

## 2023-10-02 DIAGNOSIS — I10 ESSENTIAL HYPERTENSION: ICD-10-CM

## 2023-10-02 DIAGNOSIS — R06.02 SHORTNESS OF BREATH: ICD-10-CM

## 2023-10-02 DIAGNOSIS — I73.9 PAD (PERIPHERAL ARTERY DISEASE): ICD-10-CM

## 2023-10-02 DIAGNOSIS — I48.91 ATRIAL FIBRILLATION, UNSPECIFIED TYPE: Primary | ICD-10-CM

## 2023-10-02 DIAGNOSIS — I65.23 BILATERAL CAROTID ARTERY STENOSIS: ICD-10-CM

## 2023-10-02 DIAGNOSIS — Z87.891 FORMER SMOKER: ICD-10-CM

## 2023-10-02 DIAGNOSIS — R53.82 CHRONIC FATIGUE: ICD-10-CM

## 2023-10-02 DIAGNOSIS — I50.9 CONGESTIVE HEART FAILURE, UNSPECIFIED HF CHRONICITY, UNSPECIFIED HEART FAILURE TYPE: ICD-10-CM

## 2023-10-02 PROCEDURE — 83735 ASSAY OF MAGNESIUM: CPT | Performed by: INTERNAL MEDICINE

## 2023-10-02 PROCEDURE — 80048 BASIC METABOLIC PNL TOTAL CA: CPT | Performed by: INTERNAL MEDICINE

## 2023-10-02 PROCEDURE — 84443 ASSAY THYROID STIM HORMONE: CPT | Performed by: INTERNAL MEDICINE

## 2023-10-02 RX ORDER — METOLAZONE 2.5 MG/1
2.5 TABLET ORAL DAILY
COMMUNITY
Start: 2023-08-25 | End: 2023-10-02

## 2023-10-02 NOTE — PROGRESS NOTES
Subjective   Fransisco Daniel is a 78 y.o. male     Chief Complaint   Patient presents with    Follow-up     Here for 6 mo. F/u    Atrial Fibrillation    Congestive Heart Failure    Hyperlipidemia    Hypertension    Carotid Artery Disease       PROBLEM LIST:   . CHF  0.1 Echo, 12- at [facility], EF 60-65%, neg. Bubble study, mild Aortic stenosis, pleural effusion  A-Fib, [id number]= 7, on Eliquis  HTN  Hyperlipidemia  Hypothyroidism  COPD / Emphysema, followed by Dr. Mitchell.   Glaucoma  GERD  Anemia  Hx Prostate CA, followed by Dr. Ames. 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 Daniel returns for follow-up on the above    He has been stable from a cardiac standpoint from the time of his last visit.  He denies chest pain, orthopnea, or PND.  He has minimal and improved lower extremity edema.  He has occasional palpitations but these are not associated with dizziness or shortness of breath.  He describes both orthostatic lightheadedness as well as occasional dizziness at rest.  He has not been presyncopal or syncopal.    Mr. Daniel has also had several falls.  One time he tripped over a footstool another time he tripped while walking outside.  His falls do not seem to be related to lightheaded nerves or dizziness.  He has had no symptoms of TIA or stroke and he denies hemoptysis, hematemesis, melena  hematochezia or hematuria.  He does not claudicate but he describes nocturnal leg cramps.    Blood pressures have been well controlled.  Weight is down 9 pounds from prior but similar to the weight after extensive diuresis in March of this year.  The patient has had no intercurrent illnesses, injuries, or hospitalizations.                          PRIOR MEDICATIONS    Current Outpatient Medications on File Prior to Visit   Medication Sig Dispense Refill    albuterol (PROVENTIL) (2.5 MG/3ML) 0.083% nebulizer solution Take 2.5 mg by nebulization Every 4 (Four) Hours As Needed.      albuterol sulfate  (90 Base) MCG/ACT inhaler Inhale 2 puffs Every 4 (Four) Hours As Needed.      Breztri Aerosphere 160-9-4.8 MCG/ACT aerosol inhaler 2 puffs 2 (Two) Times a Day.      budesonide (PULMICORT) 0.5 MG/2ML nebulizer solution 2 (Two) Times a Day.      Eliquis 2.5 MG tablet tablet TAKE ONE TABLET BY MOUTH TWICE A  tablet 1    empagliflozin (Jardiance) 10 MG tablet tablet Take 1 tablet by mouth Daily. 30 tablet 11    Ferrous Sulfate (IRON PO) Take  by mouth Daily.      levothyroxine (SYNTHROID, LEVOTHROID) 25 MCG tablet Daily.      Lumigan 0.01 % ophthalmic drops Administer 1 drop to both eyes Every Night.      metOLazone (ZAROXOLYN) 2.5 MG tablet Take 1 tablet by mouth Daily.      metoprolol tartrate (LOPRESSOR) 25 MG tablet 2 (Two) Times a Day. Has not taken occas. because of hypotn.      pantoprazole (PROTONIX) 40 MG EC tablet Take 1 tablet by mouth Daily.      potassium chloride (K-DUR,KLOR-CON) 20 MEQ CR tablet Take 1 tablet by mouth Daily. 90 tablet 3    Simbrinza 1-0.2 % suspension Administer 1 drop to both eyes 2 (Two) Times a Day.      ezetimibe (ZETIA) 10 MG tablet Take 1 tablet by mouth Daily.      furosemide (LASIX) 40 MG tablet TAKE 40 MG DAILY AND TAKE EXTRA TAB WITH  > 2 LB. WEIGHT GAIN 9 tablet 0     No current facility-administered medications on file prior to visit.       ALLERGIES:    Zetia  [ezetimibe]    PAST MEDICAL HISTORY:    Past Medical History:   Diagnosis Date    Anemia     Atrial fibrillation     CHF (congestive heart failure)     COPD with emphysema     Essential hypertension     GERD (gastroesophageal reflux disease)     Glaucoma     H/O aortic aneurysm repair     Hyperlipidemia     Hypothyroidism     Pleural effusion     Prostate cancer     seeds plus rad. tx's    Shortness of breath        SURGICAL HISTORY:    Past Surgical History:   Procedure Laterality Date    ABDOMINAL AORTIC ANEURYSM REPAIR      INSERTION PROSTATE RADIATION SEED      RENAL ARTERY STENT Bilateral     as well as celiac and sup. mesen. arteries    THORACENTESIS      per [facility] admit Jan.2023       SOCIAL HISTORY:    Social History     Socioeconomic History    Marital status:    Tobacco Use    Smoking status: Former     Types: Cigarettes    Smokeless tobacco: Never    Tobacco comments:     Smoked for approx. 60 yrs.   Substance and Sexual Activity    Alcohol use: Never    Drug use: Never       FAMILY HISTORY:    Family History   Problem Relation Age of Onset    Liver cancer Mother     Heart attack Father        Review of Systems   Constitutional:  Positive for fatigue.   HENT: Negative.     Eyes:  Positive for visual disturbance (glasses).   Respiratory:  Positive for shortness of breath (wears 02, only weats 02 in house if needs it.).    Cardiovascular:  Positive for palpitations (occas. HX a-fib). Negative for chest pain and leg swelling.   Gastrointestinal: Negative.    Endocrine: Negative.    Genitourinary: Negative.    Musculoskeletal:  Positive for arthralgias and myalgias.   Skin: Negative.    Allergic/Immunologic: Negative.    Neurological:  Positive for dizziness.   Hematological:  Bruises/bleeds easily.   Psychiatric/Behavioral: Negative.       VISIT VITALS:  Vitals:    10/02/23 0909   BP: 106/69   BP Location: Left arm   Patient Position: Sitting   Pulse: 77   SpO2: 96%   Weight: 55.5 kg (122 lb 6.4 oz)  "  Height: 177.8 cm (70\")      /69 (BP Location: Left arm, Patient Position: Sitting)   Pulse 77   Ht 177.8 cm (70\")   Wt 55.5 kg (122 lb 6.4 oz)   SpO2 96%   BMI 17.56 kg/m²     RECENT LABS:    Objective       Physical Exam  Vitals and nursing note reviewed.   Constitutional:       General: He is not in acute distress.     Appearance: He is well-developed.   HENT:      Head: Normocephalic and atraumatic.   Eyes:      Conjunctiva/sclera: Conjunctivae normal.      Pupils: Pupils are equal, round, and reactive to light.   Neck:      Vascular: No carotid bruit, hepatojugular reflux or JVD.      Trachea: No tracheal deviation.      Comments: Nl. Carotid upstrokes  Cardiovascular:      Rate and Rhythm: Normal rate and regular rhythm.      Pulses:           Radial pulses are 2+ on the right side and 2+ on the left side.      Heart sounds: S1 normal and S2 normal. Heart sounds are distant.   Pulmonary:      Effort: Pulmonary effort is normal.      Breath sounds: Decreased breath sounds (mod.) present. No wheezing, rhonchi or rales.      Comments: Increased Expir. Phase  Nl. Breath sound intensity  Bronchial breath sounds in the bases    Abdominal:      General: Bowel sounds are normal. There is no distension or abdominal bruit.      Palpations: Abdomen is soft. There is no mass.      Tenderness: There is no abdominal tenderness. There is no guarding or rebound.      Comments: No organomegaly   Musculoskeletal:         General: No tenderness or deformity. Normal range of motion.      Cervical back: Normal range of motion and neck supple.      Right lower leg: Edema present.      Left lower leg: Edema present.      Comments: BLE, trace ankle edema, unable to palpitate pedal pulses.   Denies claudication sx's with ambulation     Skin:     General: Skin is warm and dry.      Coloration: Skin is not pale.      Findings: No erythema or rash.   Neurological:      Mental Status: He is alert and oriented to person, place, " and time.   Psychiatric:         Behavior: Behavior normal.         Thought Content: Thought content normal.         Judgment: Judgment normal.       Procedures      Assessment & Plan   #1.  HFPEF.  Mr. Daniel is well compensated clinically today.  I think we are is close to dry weight as elliptically possible.  On multiple occasions I have asked the patient to perform daily weights and use additional diuretic on appearing basis.  It is now clearly obvious that he will not comply with that recommendation.  Therefore I think standard dosing of Lasix at 40 mg a day is most appropriate given patient limitations.  We will check basic metabolic panel magnesium and TSH today.  TSH because the patient complains of constantly feeling cold and having dry skin.    2.  Peripheral arterial disease as described above.  The patient is asymptomatic of peripheral vascular disease or lower extremity arterial insufficiency.    3.  Atrial fibrillation.  Pitting only rarely rotates and he has no bleeding.  We will continue adjusted dose Eliquis.    4.  Controlled systemic hypertension.    5.  Chronic renal insufficiency.  We will check labs as above.    6.  I had a detailed discussion today with the patient with regard to preventing falls.  He has both a cane and a walker I urged him to use those religiously.  I also asked Mr. Daniel to discuss his falls with his primary healthcare providers.    7.  Mr. Daniel will follow with his primary healthcare providers as instructed and we will plan on seeing him in follow-up in 6 months or on appearing basis as discussed in detail today.    A total of 50 minutes was spent on today's visit with Mr. Daniel reviewing prior notes, prior studies, and obtaining a history, performing a physical exam and an extensive counseling with regard to medication compliance, sodium restriction, precautions to prevent falls and regarding the risk of bleeding on Eliquis. Bleeding.   Diagnosis Plan   1. Atrial  fibrillation, unspecified type        2. Bilateral carotid artery stenosis        3. Congestive heart failure, unspecified HF chronicity, unspecified heart failure type        4. Essential hypertension        5. Mixed hyperlipidemia        6. PAD (peripheral artery disease)        7. Shortness of breath        8. Former smoker            No follow-ups on file.         Fransisco Daniel  reports that he has quit smoking. His smoking use included cigarettes. He has never used smokeless tobacco.. I have educated him on the risk of diseases from using tobacco products such as cancer, COPD, and heart disease.               BMI is below normal parameters (malnutrition). Recommendations: PCP ADDRESSING               Electronically signed by:    Scribed for Derick Jon MD by Laura Lock LPN on October 2, 2023  at 09:20 EDT    I, Derick Jon MD personally performed the services described in this documentation as scribed by the above named individual in my presence, and it is both accurate and complete. October 2, 2023 09:20 EDT      Dictated Utilizing Dragon Dictation: Part of this note may be an electronic transcription/translation of spoken language to printed text using the Dragon Dictation System.

## 2023-10-19 ENCOUNTER — TELEPHONE (OUTPATIENT)
Dept: CARDIOLOGY | Facility: CLINIC | Age: 78
End: 2023-10-19
Payer: MEDICARE

## 2023-10-19 NOTE — TELEPHONE ENCOUNTER
----- Message from MELANIE Anderson sent at 10/18/2023  1:51 AM EDT -----  Elevated glucose at 114 mildly decreased sodium which is comparable to previous at 134 chloride has increased to 95.   Normal renal function    Normal TSH and magnesium.

## 2023-10-19 NOTE — TELEPHONE ENCOUNTER
I called and spoke with patient. I advised of lab results as follows:      Elevated glucose at 114 mildly decreased sodium which is comparable to previous at 134 chloride has increased to 95.   Normal renal function     Normal TSH and magnesium.

## 2024-01-12 ENCOUNTER — PRIOR AUTHORIZATION (OUTPATIENT)
Dept: CARDIOLOGY | Facility: CLINIC | Age: 79
End: 2024-01-12
Payer: MEDICARE

## 2024-01-12 NOTE — TELEPHONE ENCOUNTER
Prior authorization request received for Jardiance. Authorization submitted through Cover My Meds. Status pending.      Jardiance covered through Medicare Part D. Pharmacy submitted authorization through Medicaid.

## 2024-01-17 DIAGNOSIS — I50.9 CONGESTIVE HEART FAILURE, UNSPECIFIED HF CHRONICITY, UNSPECIFIED HEART FAILURE TYPE: ICD-10-CM

## 2024-02-21 DIAGNOSIS — R60.0 BILATERAL LEG EDEMA: ICD-10-CM

## 2024-02-21 DIAGNOSIS — J90 PLEURAL EFFUSION: ICD-10-CM

## 2024-02-21 DIAGNOSIS — I50.9 CONGESTIVE HEART FAILURE, UNSPECIFIED HF CHRONICITY, UNSPECIFIED HEART FAILURE TYPE: ICD-10-CM

## 2024-02-21 DIAGNOSIS — R06.02 SHORTNESS OF BREATH: ICD-10-CM

## 2024-02-21 RX ORDER — METOLAZONE 2.5 MG/1
TABLET ORAL
Qty: 90 TABLET | Refills: 3 | OUTPATIENT
Start: 2024-02-21

## 2024-02-21 RX ORDER — FUROSEMIDE 40 MG/1
40 TABLET ORAL DAILY
Qty: 90 TABLET | Refills: 1 | Status: SHIPPED | OUTPATIENT
Start: 2024-02-21

## 2024-02-21 RX ORDER — POTASSIUM CHLORIDE 1500 MG/1
20 TABLET, EXTENDED RELEASE ORAL DAILY
Qty: 90 TABLET | Refills: 1 | Status: SHIPPED | OUTPATIENT
Start: 2024-02-21

## 2024-03-25 RX ORDER — METOLAZONE 2.5 MG/1
TABLET ORAL
Qty: 90 TABLET | Refills: 3 | OUTPATIENT
Start: 2024-03-25

## 2024-05-14 ENCOUNTER — OFFICE VISIT (OUTPATIENT)
Dept: CARDIOLOGY | Facility: CLINIC | Age: 79
End: 2024-05-14
Payer: MEDICARE

## 2024-05-14 VITALS
SYSTOLIC BLOOD PRESSURE: 113 MMHG | DIASTOLIC BLOOD PRESSURE: 67 MMHG | HEART RATE: 77 BPM | OXYGEN SATURATION: 93 % | WEIGHT: 128.4 LBS | BODY MASS INDEX: 18.38 KG/M2 | HEIGHT: 70 IN

## 2024-05-14 DIAGNOSIS — I48.20 ATRIAL FIBRILLATION, CHRONIC: ICD-10-CM

## 2024-05-14 DIAGNOSIS — I50.9 CONGESTIVE HEART FAILURE, UNSPECIFIED HF CHRONICITY, UNSPECIFIED HEART FAILURE TYPE: Primary | ICD-10-CM

## 2024-05-14 DIAGNOSIS — R60.0 BILATERAL LEG EDEMA: ICD-10-CM

## 2024-05-14 DIAGNOSIS — R06.02 SHORTNESS OF BREATH: ICD-10-CM

## 2024-05-14 PROCEDURE — 99214 OFFICE O/P EST MOD 30 MIN: CPT | Performed by: CLINICAL NURSE SPECIALIST

## 2024-05-14 PROCEDURE — 3078F DIAST BP <80 MM HG: CPT | Performed by: CLINICAL NURSE SPECIALIST

## 2024-05-14 PROCEDURE — 93000 ELECTROCARDIOGRAM COMPLETE: CPT | Performed by: CLINICAL NURSE SPECIALIST

## 2024-05-14 PROCEDURE — 3074F SYST BP LT 130 MM HG: CPT | Performed by: CLINICAL NURSE SPECIALIST

## 2024-05-14 RX ORDER — FUROSEMIDE 40 MG/1
40 TABLET ORAL DAILY
Qty: 90 TABLET | Refills: 3 | Status: SHIPPED | OUTPATIENT
Start: 2024-05-14

## 2024-05-14 NOTE — PROGRESS NOTES
Subjective     Fransisco Daniel is a 79 y.o. male who presents today for Follow-up.    CHIEF COMPLIANT  Chief Complaint   Patient presents with    Follow-up       Active Problems:  0.CHF  0.1 Echo, 12- at [facility], EF 60-65%, neg. Bubble study, mild Aortic stenosis, pleural effusion  A-Fib, [id number]= 7, on Eliquis  HTN  Hyperlipidemia  Hypothyroidism  COPD / Emphysema, followed by Dr. Mitchell.   Glaucoma  GERD  Anemia  Hx Prostate CA, followed by Dr. Ames. 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  Over the past few weeks he has been treated for pneumonia by pulmonology.  He has finished antibiotics and just finished steroids yesterday.  Last Thursday he states he had a CT scan done of his chest which was ordered by Dr Hoffman.  He is still waiting on result.  The patient and his wife states that he ran out of Lasix and there had been some confusion at the pharmacy regarding his refill.  He has been out of this for over a month.  He does have bilateral lower extremity edema and has had increasing shortness of breath which he does attribute to pneumonia but also feels like it has been worse since he has been off Lasix.  He denies chest pain, syncope or near syncope.  He has no signs or symptoms of bleeding.  No signs or symptoms of stroke.  EKG does show that he remains in A-fib, rate controlled.  He has noted some palpitations over the last few weeks and states it feels like something is flopping in his chest.  He does feel like this got worse when he had pneumonia    PRIOR MEDS  Current Outpatient  Medications on File Prior to Visit   Medication Sig Dispense Refill    albuterol (PROVENTIL) (2.5 MG/3ML) 0.083% nebulizer solution Take 2.5 mg by nebulization Every 4 (Four) Hours As Needed.      albuterol sulfate  (90 Base) MCG/ACT inhaler Inhale 2 puffs Every 4 (Four) Hours As Needed.      Breztri Aerosphere 160-9-4.8 MCG/ACT aerosol inhaler 2 puffs 2 (Two) Times a Day.      budesonide (PULMICORT) 0.5 MG/2ML nebulizer solution 2 (Two) Times a Day.      Eliquis 2.5 MG tablet tablet TAKE ONE TABLET BY MOUTH TWICE A  tablet 1    empagliflozin (Jardiance) 10 MG tablet tablet Take 1 tablet by mouth Daily. 90 tablet 3    Ferrous Sulfate (IRON PO) Take  by mouth Daily.      levothyroxine (SYNTHROID, LEVOTHROID) 25 MCG tablet Daily.      Lumigan 0.01 % ophthalmic drops Administer 1 drop to both eyes Every Night.      metoprolol tartrate (LOPRESSOR) 25 MG tablet 2 (Two) Times a Day. Has not taken occas. because of hypotn.      O2 (OXYGEN) Inhale 2 L/min.      pantoprazole (PROTONIX) 40 MG EC tablet Take 1 tablet by mouth Daily.      potassium chloride (KLOR-CON) 20 MEQ CR tablet TAKE ONE TABLET BY MOUTH DAILY 90 tablet 1    Simbrinza 1-0.2 % suspension Administer 1 drop to both eyes 2 (Two) Times a Day.      [DISCONTINUED] furosemide (LASIX) 40 MG tablet Take 1 tablet by mouth Daily. 90 tablet 1     No current facility-administered medications on file prior to visit.       ALLERGIES  Zetia [ezetimibe]    HISTORY  Past Medical History:   Diagnosis Date    Anemia     Atrial fibrillation     CHF (congestive heart failure)     COPD with emphysema     Essential hypertension     GERD (gastroesophageal reflux disease)     Glaucoma     H/O aortic aneurysm repair     Hyperlipidemia     Hypothyroidism     Pleural effusion     Prostate cancer     seeds plus rad. tx's    Shortness of breath        Social History     Socioeconomic History    Marital status:    Tobacco Use    Smoking status: Former     Types:  "Cigarettes    Smokeless tobacco: Never    Tobacco comments:     Smoked for approx. 60 yrs.   Substance and Sexual Activity    Alcohol use: Never    Drug use: Never       Family History   Problem Relation Age of Onset    Liver cancer Mother     Heart attack Father        Review of Systems   Constitutional:  Positive for fatigue.   HENT:  Positive for rhinorrhea. Negative for congestion and sore throat.    Eyes:  Positive for visual disturbance.   Respiratory:  Positive for shortness of breath (3L 02, still easily SoB). Negative for chest tightness.         Pt has been having worsening SoB x 1 mth, was dx'd w/ pneumonia. Stated Dr. Hoffman has not done anything to address it and are considering new pulm.    Cardiovascular:  Positive for palpitations (\"something flopping around a lot of the time, especially at night.\" Like something is \"beating me\" from the inside.) and leg swelling (Stated their lasix RF was denied. I looked into it and px had requested wrong fluid pill. I advised pt to call in the future w/ concerns regd medicines.). Negative for chest pain.   Gastrointestinal: Negative.    Genitourinary: Negative.    Musculoskeletal:  Positive for gait problem (Ambulates w/ cane).   Allergic/Immunologic: Positive for environmental allergies.   Neurological:  Positive for dizziness (\"all the time\" bel;ieves it's due to his vision issues) and weakness (Generalized, stated he has no strength). Negative for syncope, light-headedness, numbness and headaches.   Hematological:  Does not bruise/bleed easily.   Psychiatric/Behavioral:  Negative for sleep disturbance.        Objective     VITALS: /67   Pulse 77   Ht 177.8 cm (70\")   Wt 58.2 kg (128 lb 6.4 oz)   SpO2 93% Comment: 3L  BMI 18.42 kg/m²     LABS:   Lab Results (most recent)       None            IMAGING:   No Images in the past 120 days found..    EXAM:  Physical Exam  Constitutional:       Appearance: Normal appearance.   Eyes:      Pupils: Pupils are " equal, round, and reactive to light.   Cardiovascular:      Rate and Rhythm: Normal rate. Rhythm irregular.      Pulses:           Carotid pulses are 2+ on the right side and 2+ on the left side.       Radial pulses are 2+ on the right side and 2+ on the left side.        Dorsalis pedis pulses are 1+ on the right side and 1+ on the left side.        Posterior tibial pulses are 1+ on the right side and 1+ on the left side.      Heart sounds: Normal heart sounds.   Pulmonary:      Effort: Pulmonary effort is normal.      Breath sounds: Decreased air movement present. Examination of the right-upper field reveals wheezing. Examination of the left-upper field reveals wheezing. Examination of the right-lower field reveals decreased breath sounds. Examination of the left-lower field reveals decreased breath sounds. Decreased breath sounds and wheezing present.   Abdominal:      General: Bowel sounds are normal.      Palpations: Abdomen is soft.   Musculoskeletal:      Right lower leg: Edema present.      Left lower leg: Edema present.   Skin:     General: Skin is warm and dry.      Capillary Refill: Capillary refill takes less than 2 seconds.   Neurological:      General: No focal deficit present.      Mental Status: He is alert and oriented to person, place, and time.   Psychiatric:         Mood and Affect: Mood normal.         Thought Content: Thought content normal.         Procedure     ECG 12 Lead    Date/Time: 5/14/2024 8:22 AM  Performed by: Maxine Lynn APRN    Authorized by: Maxine Lynn APRN  Comparison: compared with previous ECG from 1/18/2023  Rhythm: atrial fibrillation  Ectopy: unifocal PVCs  BPM: 93             Assessment & Plan    Diagnosis Plan   1. Congestive heart failure, unspecified HF chronicity, unspecified heart failure type  furosemide (LASIX) 40 MG tablet      2. Shortness of breath  ECG 12 Lead    furosemide (LASIX) 40 MG tablet      3. Bilateral leg edema  furosemide (LASIX) 40 MG  tablet      4. Atrial fibrillation, chronic          Plan:  1.  CHF: Will resume Lasix at previous dose of 40 mg daily.  I have asked the patient and his wife to get this started back today and to call us back in 2 to 3 days if they have not noticed a difference in his edema or breathing.  We may need to increase the dose to twice daily for 2 to 3 days where he has been off of it for a while.  2.  Shortness of breath: His shortness of breath is likely multifactorial.  This has been related to recovering from pneumonia as well as running out of Lasix.  Will resume Lasix as described above to see if this will have an impact on symptoms.  They were advised to notify our office if his symptoms do not improve within the next 2 to 3 days.  We are obtaining a copy of his CT scan to review.  He was advised to follow-up with pulmonology as scheduled.  3.  Lateral leg edema: Plan as described above.  4.  Chronic atrial fibrillation: Rate controlled per EKG.  Will continue Eliquis for long-term anticoagulation and metoprolol for rate control.  He has no current signs or symptoms of bleeding or no current signs or symptoms of stroke.  Return in about 3 months (around 8/14/2024).    Fransisco Daniel  reports that he has quit smoking. His smoking use included cigarettes. He has never used smokeless tobacco.          BMI is below normal parameters (malnutrition). Recommendations: referral to primary care    Advance Care Planning   ACP discussion was declined by the patient. Patient does not have an advance directive, declines further assistance.           MEDS ORDERED DURING VISIT:  New Medications Ordered This Visit   Medications    furosemide (LASIX) 40 MG tablet     Sig: Take 1 tablet by mouth Daily.     Dispense:  90 tablet     Refill:  3       DISCONTINUED MEDS DURING VISIT:   Medications Discontinued During This Encounter   Medication Reason    furosemide (LASIX) 40 MG tablet Reorder          This document has been  electronically signed by MELANIE Horne  May 14, 2024 09:32 EDT    Dictated Utilizing Dragon Dictation: Part of this note may be an electronic transcription/translation of spoken language to printed text using the Dragon Dictation System

## 2024-07-18 ENCOUNTER — TELEPHONE (OUTPATIENT)
Dept: CARDIOLOGY | Facility: CLINIC | Age: 79
End: 2024-07-18
Payer: MEDICARE

## 2024-07-18 RX ORDER — POTASSIUM CHLORIDE 20 MEQ/1
20 TABLET, EXTENDED RELEASE ORAL DAILY
Qty: 90 TABLET | Refills: 1 | Status: SHIPPED | OUTPATIENT
Start: 2024-07-18

## 2024-07-18 NOTE — TELEPHONE ENCOUNTER
PT'S WIFE CAME INTO THE OFFICE STATING PT NEEDS A REFILL ON HIS POTASSIUM MEDICATION. [facility] STATED THEY DO NOT HAVE POTASSIUM LISTED AS A MED. PT ONLY HAS TWO DOSES LEFT AND WILL NEED THE REFILL STAT PLEASE. THANK YOU

## 2024-07-30 ENCOUNTER — TELEPHONE (OUTPATIENT)
Dept: CARDIOLOGY | Facility: CLINIC | Age: 79
End: 2024-07-30
Payer: MEDICARE

## 2024-07-30 DIAGNOSIS — I50.9 CONGESTIVE HEART FAILURE, UNSPECIFIED HF CHRONICITY, UNSPECIFIED HEART FAILURE TYPE: ICD-10-CM

## 2024-07-30 DIAGNOSIS — R06.02 SHORTNESS OF BREATH: ICD-10-CM

## 2024-07-30 DIAGNOSIS — R60.0 BILATERAL LEG EDEMA: ICD-10-CM

## 2024-07-30 RX ORDER — FUROSEMIDE 20 MG/1
20 TABLET ORAL DAILY
Qty: 90 TABLET | Refills: 3 | Status: SHIPPED | OUTPATIENT
Start: 2024-07-30

## 2024-07-30 NOTE — TELEPHONE ENCOUNTER
Called and informed pt's wife of the message from shoshana, advised her to check pt's BP 1hr after meds and keep log, then call as instructed. She verbalized understanding.

## 2024-07-30 NOTE — TELEPHONE ENCOUNTER
Pt's wife came into  office, pt just left Dr. Hoffman's office, while there pt was told his heart rate was low and needed to come by to let us know. Pt's wife didn't know what his heart rate was when questioned, let her know we'd try to get that info from Dr. Hoffman's office then give them a call back. Pt is on metoprolol 25mg x2 daily, has been dizzy for about a week.

## 2024-07-30 NOTE — TELEPHONE ENCOUNTER
Per chart review, HR at OV on 5/14 was 77, same at OV on 10/02/23. 99 on 4/17/23.       Called Dr. Hoffman's office, sat on hold for over 5min w/o speaking w/ staff.       Called pt's wife, she stated she is still unsure of his HR. I asked about his sx, she stated he's dizzy and has no energy. Stated pt has been this way for a week or so. Do not check BP or HR at home, but can start. She is not home now and will not be until we close later today, so she will be unable to give us pt's VS. She asked if pt could hold his Metoprolol.   I asked if pt has fever, sx of illness, etc. She stated pt has COPD and lung CA.

## 2024-07-30 NOTE — TELEPHONE ENCOUNTER
I do not like to hold medication if I do not have vital signs but in this situation I would hold it until they are able to check it and call back.  Can we have Bk request the note from Dr Hoffman's office?

## 2024-07-30 NOTE — TELEPHONE ENCOUNTER
Decrease lasix to 20mg daily.  Monitor BP.  We may need to reduce metoprolol but would like to try reducing lasix first.  Have them call back with updated BP on Thursday afternoon or Friday morning.  Thanks

## 2024-07-30 NOTE — TELEPHONE ENCOUNTER
I called Dr. Hoffman's office again, I requested BP and HR as well as OV note. She stated she would send OV note once available. RR 18 HR 90 BP 90/56.

## 2024-09-11 ENCOUNTER — TELEPHONE (OUTPATIENT)
Dept: CARDIOLOGY | Facility: CLINIC | Age: 79
End: 2024-09-11
Payer: MEDICARE

## 2024-09-11 NOTE — TELEPHONE ENCOUNTER
PT GOES FOR TREATMENTS EVERYDAY AND SAID THEY WOULD CALL BACK TO [facility] WHEN THEY WAS AVAIL